# Patient Record
Sex: MALE | Race: WHITE | URBAN - METROPOLITAN AREA
[De-identification: names, ages, dates, MRNs, and addresses within clinical notes are randomized per-mention and may not be internally consistent; named-entity substitution may affect disease eponyms.]

---

## 2017-05-10 ENCOUNTER — HOSPITAL ENCOUNTER (OUTPATIENT)
Dept: LAB | Age: 27
Discharge: HOME | End: 2017-05-10
Payer: COMMERCIAL

## 2017-05-10 ENCOUNTER — OFFICE VISIT (OUTPATIENT)
Dept: FAMILY MEDICINE CLINIC | Age: 27
End: 2017-05-10

## 2017-05-10 VITALS
WEIGHT: 156.8 LBS | OXYGEN SATURATION: 98 % | SYSTOLIC BLOOD PRESSURE: 110 MMHG | HEIGHT: 67 IN | BODY MASS INDEX: 24.61 KG/M2 | HEART RATE: 80 BPM | DIASTOLIC BLOOD PRESSURE: 80 MMHG | TEMPERATURE: 98.1 F | RESPIRATION RATE: 20 BRPM

## 2017-05-10 DIAGNOSIS — R21 MALAR RASH: ICD-10-CM

## 2017-05-10 DIAGNOSIS — G47.9 SLEEP DISTURBANCE: Primary | ICD-10-CM

## 2017-05-10 PROCEDURE — 86225 DNA ANTIBODY NATIVE: CPT | Performed by: INTERNAL MEDICINE

## 2017-05-10 PROCEDURE — 86038 ANTINUCLEAR ANTIBODIES: CPT | Performed by: INTERNAL MEDICINE

## 2017-05-10 PROCEDURE — 36415 COLL VENOUS BLD VENIPUNCTURE: CPT | Performed by: INTERNAL MEDICINE

## 2017-05-10 PROCEDURE — 86235 NUCLEAR ANTIGEN ANTIBODY: CPT | Performed by: INTERNAL MEDICINE

## 2017-05-10 RX ORDER — TRAZODONE HYDROCHLORIDE 100 MG/1
100 TABLET ORAL
Qty: 30 TAB | Refills: 1 | Status: SHIPPED | OUTPATIENT
Start: 2017-05-10

## 2017-05-10 NOTE — PATIENT INSTRUCTIONS
You have been referred to dermatology. Please call one of the preferred providers listed below and schedule your appointment. Once you have scheduled your appointment, please call the office at [phone] leave the details of your appointment (provider you will be seeing, appointment date and time) on the voice mail. [facility]   Dr. Yoon Bethea    [address] , [address]                Learning About Sleeping Well  What does sleeping well mean? Sleeping well means getting enough sleep. How much sleep is enough varies among people. The number of hours you sleep is not as important as how you feel when you wake up. If you do not feel refreshed, you probably need more sleep. Another sign of not getting enough sleep is feeling tired during the day. The average total nightly sleep time is 7½ to 8 hours. Healthy adults may need a little more or a little less than this. Why is getting enough sleep important? Getting enough quality sleep is a basic part of good health. When your sleep suffers, your mood and your thoughts can suffer too. You may find yourself feeling more grumpy or stressed. Not getting enough sleep also can lead to serious problems, including injury, accidents, anxiety, and depression. What might cause poor sleeping? Many things can cause sleep problems, including:  · Stress. Stress can be caused by fear about a single event, such as giving a speech. Or you may have ongoing stress, such as worry about work or school. · Depression, anxiety, and other mental or emotional conditions. · Changes in your sleep habits or surroundings. This includes changes that happen where you sleep, such as noise, light, or sleeping in a different bed. It also includes changes in your sleep pattern, such as having jet lag or working a late shift. · Health problems, such as pain, breathing problems, and restless legs syndrome.   · Lack of regular exercise. How can you help yourself? Here are some tips that may help you sleep more soundly and wake up feeling more refreshed. Your sleeping area  · Use your bedroom only for sleeping and sex. A bit of light reading may help you fall asleep. But if it doesn't, do your reading elsewhere in the house. Don't watch TV in bed. · Be sure your bed is big enough to stretch out comfortably, especially if you have a sleep partner. · Keep your bedroom quiet, dark, and cool. Use curtains, blinds, or a sleep mask to block out light. To block out noise, use earplugs, soothing music, or a \"white noise\" machine. Your evening and bedtime routine  · Create a relaxing bedtime routine. You might want to take a warm shower or bath, listen to soothing music, or drink a cup of noncaffeinated tea. · Go to bed at the same time every night. And get up at the same time every morning, even if you feel tired. What to avoid  · Limit caffeine (coffee, tea, caffeinated sodas) during the day, and don't have any for at least 4 to 6 hours before bedtime. · Don't drink alcohol before bedtime. Alcohol can cause you to wake up more often during the night. · Don't smoke or use tobacco, especially in the evening. Nicotine can keep you awake. · Don't take naps during the day, especially close to bedtime. · Don't lie in bed awake for too long. If you can't fall asleep, or if you wake up in the middle of the night and can't get back to sleep within 15 minutes or so, get out of bed and go to another room until you feel sleepy. · Don't take medicine right before bed that may keep you awake or make you feel hyper or energized. Your doctor can tell you if your medicine may do this and if you can take it earlier in the day. If you can't sleep  · Imagine yourself in a peaceful, pleasant scene. Focus on the details and feelings of being in a place that is relaxing. · Get up and do a quiet or boring activity until you feel sleepy.   · Don't drink any liquids after 6 p.m. if you wake up often because you have to go to the bathroom. Where can you learn more? Go to http://briseida-evelia.info/. Enter F394 in the search box to learn more about \"Learning About Sleeping Well. \"  Current as of: July 26, 2016  Content Version: 11.2  © 6244-5357 [employer]. Care instructions adapted under license by [employer] (which disclaims liability or warranty for this information). If you have questions about a medical condition or this instruction, always ask your healthcare professional. [place] [address] any warranty or liability for your use of this information.

## 2017-05-10 NOTE — MR AVS SNAPSHOT
Visit Information Date & Time Provider Department Dept. Phone Encounter #  
 5/10/2017  9:30 AM Marcio Landers, [facility] [phone] [id number] Upcoming Health Maintenance Date Due DTaP/Tdap/Td series (1 - Tdap) 10/26/2012 INFLUENZA AGE 9 TO ADULT 8/1/2017 Allergies as of 5/10/2017  Review Complete On: 8/23/2016 By: Marcio Landers MD  
  
 Severity Noted Reaction Type Reactions Pcn [Penicillins]  07/01/2016    Rash Current Immunizations  Reviewed on 7/1/2016 No immunizations on file. Not reviewed this visit You Were Diagnosed With   
  
 Codes Comments Sleep disturbance    -  Primary ICD-10-CM: G47.9 ICD-9-CM: 780.50 Malar rash     ICD-10-CM: R21 
ICD-9-CM: 782.1 Vitals BP Pulse Temp Resp Height(growth percentile) Weight(growth percentile) 110/80 (BP 1 Location: Left arm, BP Patient Position: Sitting) 80 98.1 °F (36.7 °C) (Oral) 20 5' 6.5\" (1.689 m) 156 lb 12.8 oz (71.1 kg) SpO2 BMI Smoking Status 98% 24.93 kg/m2 Never Smoker BMI and BSA Data Body Mass Index Body Surface Area 24.93 kg/m 2 1.83 m 2 Preferred Pharmacy Pharmacy Name Phone [facility] [facility] [phone] Your Updated Medication List  
  
   
This list is accurate as of: 5/10/17 10:54 AM.  Always use your most recent med list.  
  
  
  
  
 melatonin 3 mg tablet Take 1 mg by mouth every evening. traZODone 100 mg tablet Commonly known as:  Arneta Messenger Take 1 Tab by mouth nightly. Prescriptions Sent to Pharmacy Refills  
 traZODone (DESYREL) 100 mg tablet 1 Sig: Take 1 Tab by mouth nightly. Class: Normal  
 Pharmacy: [facility] [address] [facility] [facility] [address] Ph #: [phone] Route: Oral  
  
To-Do List   
 05/10/2017 Lab:  BART SHELL, W/REFLEX CASCADE   
  
 05/10/2017 Lab: DNA AB, DOUBLE STRANDED, IGG   
  
 05/10/2017 Lab:  [facility] ANTIBODIES Patient Instructions You have been referred to dermatology. Please call one of the preferred providers listed below and schedule your appointment. Once you have scheduled your appointment, please call the office at [phone] leave the details of your appointment (provider you will be seeing, appointment date and time) on the voice mail. [facility] Dermat[facility] Dr. Edie Serna.,  Lizzy Lozano [address] Learning About Sleeping Well What does sleeping well mean? Sleeping well means getting enough sleep. How much sleep is enough varies among people. The number of hours you sleep is not as important as how you feel when you wake up. If you do not feel refreshed, you probably need more sleep. Another sign of not getting enough sleep is feeling tired during the day. The average total nightly sleep time is 7½ to 8 hours. Healthy adults may need a little more or a little less than this. Why is getting enough sleep important? Getting enough quality sleep is a basic part of good health. When your sleep suffers, your mood and your thoughts can suffer too. You may find yourself feeling more grumpy or stressed. Not getting enough sleep also can lead to serious problems, including injury, accidents, anxiety, and depression. What might cause poor sleeping? Many things can cause sleep problems, including: · Stress. Stress can be caused by fear about a single event, such as giving a speech. Or you may have ongoing stress, such as worry about work or school. · Depression, anxiety, and other mental or emotional conditions. · Changes in your sleep habits or surroundings. This includes changes that happen where you sleep, such as noise, light, or sleeping in a different bed.  It also includes changes in your sleep pattern, such as having jet lag or working a late shift. · Health problems, such as pain, breathing problems, and restless legs syndrome. · Lack of regular exercise. How can you help yourself? Here are some tips that may help you sleep more soundly and wake up feeling more refreshed. Your sleeping area · Use your bedroom only for sleeping and sex. A bit of light reading may help you fall asleep. But if it doesn't, do your reading elsewhere in the house. Don't watch TV in bed. · Be sure your bed is big enough to stretch out comfortably, especially if you have a sleep partner. · Keep your bedroom quiet, dark, and cool. Use curtains, blinds, or a sleep mask to block out light. To block out noise, use earplugs, soothing music, or a \"white noise\" machine. Your evening and bedtime routine · Create a relaxing bedtime routine. You might want to take a warm shower or bath, listen to soothing music, or drink a cup of noncaffeinated tea. · Go to bed at the same time every night. And get up at the same time every morning, even if you feel tired. What to avoid · Limit caffeine (coffee, tea, caffeinated sodas) during the day, and don't have any for at least 4 to 6 hours before bedtime. · Don't drink alcohol before bedtime. Alcohol can cause you to wake up more often during the night. · Don't smoke or use tobacco, especially in the evening. Nicotine can keep you awake. · Don't take naps during the day, especially close to bedtime. · Don't lie in bed awake for too long. If you can't fall asleep, or if you wake up in the middle of the night and can't get back to sleep within 15 minutes or so, get out of bed and go to another room until you feel sleepy. · Don't take medicine right before bed that may keep you awake or make you feel hyper or energized. Your doctor can tell you if your medicine may do this and if you can take it earlier in the day. If you can't sleep · Imagine yourself in a peaceful, pleasant scene.  Focus on the details and feelings of being in a place that is relaxing. · Get up and do a quiet or boring activity until you feel sleepy. · Don't drink any liquids after 6 p.m. if you wake up often because you have to go to the bathroom. Where can you learn more? Go to http://briseida-evelia.info/. Enter N567 in the search box to learn more about \"Learning About Sleeping Well. \" Current as of: July 26, 2016 Content Version: 11.2 © 8907-1925 [employer] Care instructions adapted under license by [employer] (which disclaims liability or warranty for this information). If you have questions about a medical condition or this instruction, always ask your healthcare professional. [address] any warranty or liability for your use of this information. Introducing [facility] & HEALTH SERVICES! Maria Esther Nath introduces Secure Fortress patient portal. Now you can access parts of your medical record, email your doctor's office, and request medication refills online. 1. In your internet browser, go to [email or web address] 2. Click on the First Time User? Click Here link in the Sign In box. You will see the New Member Sign Up page. 3. Enter your Secure Fortress Access Code exactly as it appears below. You will not need to use this code after youve completed the sign-up process. If you do not sign up before the expiration date, you must request a new code. · Secure Fortress Access Code: [email or web address] Expires: 8/8/2017 10:54 AM 
 
4. Enter the last four digits of your Social Security Number (xxxx) and Date of Birth (mm/dd/yyyy) as indicated and click Submit. You will be taken to the next sign-up page. 5. Create a Secure Fortress ID. This will be your Secure Fortress login ID and cannot be changed, so think of one that is secure and easy to remember. 6. Create a Secure Fortress password. You can change your password at any time. 7. Enter your Password Reset Question and Answer.  This can be used at a later time if you forget your password. 8. Enter your e-mail address. You will receive e-mail notification when new information is available in [address] 9. Click Sign Up. You can now view and download portions of your medical record. 10. Click the Download Summary menu link to download a portable copy of your medical information. If you have questions, please visit the Frequently Asked Questions section of the Vector[employer] website. Remember, [employer] is NOT to be used for urgent needs. For medical emergencies, dial 911. Now available from your iPhone and Android! Please provide this summary of care documentation to your next provider. Your primary care clinician is listed as Rajani 51. If you have any questions after today's visit, please call [phone].

## 2017-05-10 NOTE — PROGRESS NOTES
Nakita Castro is a 23 y.o. male  Chief Complaint   Patient presents with    Sleep Problem     1. Have you been to the ER, urgent care clinic since your last visit? Hospitalized since your last visit? No    2. Have you seen or consulted any other health care providers outside of the [address] [place] [address] since your last visit? Include any pap smears or colon screening.  No

## 2017-05-11 LAB
ANA SER QL: NEGATIVE
DSDNA AB SER-ACNC: <1 IU/ML (ref 0–9)
ENA SM AB SER-ACNC: <0.2 AI (ref 0–0.9)
SEE BELOW:, 164879: NORMAL

## 2017-05-11 NOTE — PROGRESS NOTES
Assessment/Plan:    *Bogdan was seen today for sleep problem. Diagnoses and all orders for this visit:    Sleep disturbance  -     traZODone (DESYREL) 100 mg tablet; Take 1 Tab by mouth nightly. Malar rash  -     BART QL, W/REFLEX CASCADE; Future  -     DNA AB, DOUBLE STRANDED, IGG; Future  -     SMITH ANTIBODIES; Future  -     BART QL, W/REFLEX CASCADE  -     DNA AB, DOUBLE STRANDED, IGG  -     MILLAN ANTIBODIES        Pt was instructed to try Trazodone as 50 mg then can advance if needed to 100 mg and then 200 mg qhs. Will notify me if he needs to increase dose. Will contact pt on above results. Will await dermatology recommendations. The plan was discussed with the patient. The patient verbalized understanding and is in agreement with the plan. All medication potential side effects were discussed with the patient.    -------------------------------------------------------------------------------------------------------------------        Aniyah Whitten is a 23 y.o. male and presents with Sleep Problem         Subjective:  Pt here for sleep issues. Has not been getting proper rest, falls asleep without issue but will then wake up several times after this. Has noted this ongoing for 1 year. He relates it to having nightmares that disturb his sleep and then he can not fall back asleep. Had a bad break-up with a girlfriend that he has not been able to forget. He has been thinking about seeing a therapist.  Natalie Quiñones his bother had seen one in the past who he was happy with. Pt also has noted issues with his skin for a few years. Says it is a family thing, his father and brother both have sensitive skin. He has noted a rash on both cheeks, under the eyes. ROS:  Constitutional: No recent weight change. No weakness/fatigue. No f/c. Skin: No rashes, change in nails/hair, itching   HENT: No HA, dizziness. No hearing loss/tinnitus. No nasal congestion/discharge.    Eyes: No change in vision, double/blurred vision or eye pain/redness. Cardiovascular: No CP/palpitations. No PURCELL/orthopnea/PND. Respiratory: No cough/sputum, dyspnea, wheezing. Gastointestinal: No dysphagia, reflux. No n/v. No constipation/diarrhea. No melena/rectal bleeding. Genitourinary: No dysuria, urinary hesitancy, nocturia, hematuria. No incontinence. Musculoskeletal: No joint pain/stiffness. No muscle pain/tenderness. Endo: No heat/cold intolerance, no polyuria/polydypsia. Heme: No h/o anemia. No easy bleeding/bruising. Allergy/Immunology: No seasonal rhinitis. Denies frequent colds, sinus/ear infections. Neurological: No seizures/numbness/weakness. No paresthesias. Psychiatric:  No depression, anxiety. The problem list was updated as a part of today's visit. There is no problem list on file for this patient. The PSH, FH were reviewed. SH:  Social History   Substance Use Topics    Smoking status: Never Smoker    Smokeless tobacco: None    Alcohol use 3.0 oz/week     5 Cans of beer per week      Comment: 5       Medications/Allergies:  Current Outpatient Prescriptions on File Prior to Visit   Medication Sig Dispense Refill    melatonin 3 mg tablet Take 1 mg by mouth every evening. No current facility-administered medications on file prior to visit. Allergies   Allergen Reactions    Pcn [Penicillins] Rash         Health Maintenance:   Health Maintenance   Topic Date Due    DTaP/Tdap/Td series (1 - Tdap) 10/26/2012    INFLUENZA AGE 9 TO ADULT  08/01/2017       Objective:  Visit Vitals    /80 (BP 1 Location: Left arm, BP Patient Position: Sitting)    Pulse 80    Temp 98.1 °F (36.7 °C) (Oral)    Resp 20    Ht 5' 6.5\" (1.689 m)    Wt 156 lb 12.8 oz (71.1 kg)    SpO2 98%    BMI 24.93 kg/m2          Nurses notes and VS reviewed.       Physical Examination: General appearance - alert, well appearing, and in no distress        Labwork and Ancillary Studies:    CBC w/Diff  Lab Results   Component Value Date/Time    WBC 4.2 07/08/2016 10:40 AM    HGB 14.1 07/08/2016 10:40 AM    PLATELET 384 61/36/3032 10:40 AM         Basic Metabolic Profile  Lab Results   Component Value Date/Time    Sodium 140 07/08/2016 10:40 AM    Potassium 4.4 07/08/2016 10:40 AM    Chloride 99 07/08/2016 10:40 AM    CO2 27 07/08/2016 10:40 AM    Anion gap 14.0 07/08/2016 10:40 AM    Glucose 87 07/08/2016 10:40 AM    BUN 19 07/08/2016 10:40 AM    Creatinine 1.0 07/08/2016 10:40 AM    Calcium 9.6 07/08/2016 10:40 AM         LFT  Lab Results   Component Value Date/Time    ALT (SGPT) 26 07/08/2016 10:40 AM    AST (SGOT) 31 07/08/2016 10:40 AM    Alk.  phosphatase 55 07/08/2016 10:40 AM    Bilirubin, direct <0.2 07/08/2016 10:40 AM    Bilirubin, total 0.7 07/08/2016 10:40 AM         Cholesterol  Lab Results   Component Value Date/Time    Cholesterol, total 195 07/08/2016 10:40 AM    HDL Cholesterol 61 07/08/2016 10:40 AM    LDL, calculated 120 07/08/2016 10:40 AM    Triglyceride 71 07/08/2016 10:40 AM

## 2017-05-12 NOTE — PROGRESS NOTES
Notify pt his labs were normal.  His rash is NOT lupus related. Will see what dermatology recommends.

## 2020-05-11 ENCOUNTER — TELEPHONE (OUTPATIENT)
Dept: FAMILY MEDICINE CLINIC | Age: 30
End: 2020-05-11

## 2020-05-11 NOTE — TELEPHONE ENCOUNTER
Pt is calling because he would like an office appt to discuss fertility issues was told that we are only doing virtual appts at the moment.  Not sure if this visit can be done through virtual or if pt needs an in office appt

## 2020-05-12 ENCOUNTER — VIRTUAL VISIT (OUTPATIENT)
Dept: FAMILY MEDICINE CLINIC | Age: 30
End: 2020-05-12

## 2020-05-12 DIAGNOSIS — N46.9 INFERTILITY MALE: Primary | ICD-10-CM

## 2020-05-14 ENCOUNTER — HOSPITAL ENCOUNTER (OUTPATIENT)
Dept: LAB | Age: 30
Discharge: HOME | End: 2020-05-14
Payer: COMMERCIAL

## 2020-05-14 DIAGNOSIS — N46.9 INFERTILITY MALE: ICD-10-CM

## 2020-05-14 PROCEDURE — 36415 COLL VENOUS BLD VENIPUNCTURE: CPT

## 2020-05-14 PROCEDURE — 84403 ASSAY OF TOTAL TESTOSTERONE: CPT

## 2020-05-14 PROCEDURE — 83001 ASSAY OF GONADOTROPIN (FSH): CPT

## 2020-05-14 PROCEDURE — 84146 ASSAY OF PROLACTIN: CPT

## 2020-05-15 LAB
FSH SERPL-ACNC: 5.4 MIU/ML
LH SERPL-ACNC: 3.7 MIU/ML
PROLACTIN SERPL-MCNC: 9 NG/ML

## 2020-05-18 LAB
TESTOST FREE SERPL-MCNC: 12.8 PG/ML (ref 9.3–26.5)
TESTOST SERPL-MCNC: 618 NG/DL (ref 264–916)

## 2020-05-18 NOTE — PROGRESS NOTES
Please notify pt that his labs all returned normal.  If he wishes to pursue things further, he can reach out to one of the referrals we generated. I believe that it is likely just a situation that needs more time with regards to his partner being able to conceive. So, I think he should give it more time like until the end of this year. If they are eager to conceive then they should track her cycles and ovulation. She can consult her Gyn on this.

## 2020-05-19 NOTE — PROGRESS NOTES
Spoke to pt. He verbalized understanding of results and recommendations. I told him referrals were sent yesterday if he wants to pursue this further on his side of the matter.

## 2022-02-03 NOTE — PROGRESS NOTES
Gregory Armendariz is a 30 y.o. male who was seen by synchronous (real-time) audio-video technology on 5/12/2020. Consent: Gregory Armendariz, who was seen by synchronous (real-time) audio-video technology, and/or his healthcare decision maker, is aware that this patient-initiated, Telehealth encounter on 5/12/2020 is a billable service, with coverage as determined by his insurance carrier. He is aware that he may receive a bill and has provided verbal consent to proceed: Yes. Assessment & Plan:   Diagnoses and all orders for this visit:    1. Infertility male  -     [facility] AND LH; Future  -     TESTOSTERONE, FREE & TOTAL; Future  -     REFERRAL TO INFERTILITY  -     REFERRAL TO UROLOGY  -     PROLACTIN; Future        Will await results and he will follow through with one of the referrals above. 712  Subjective: Gregory Armendariz is a 30 y.o. male who was seen for Infertility    Pt was seen on a virtual visit today. Pt has some concerns about possible infertility. His girlfriend has been off birth control x 4 months and although they have not been actively trying, they have had intercourse several times and she has not gotten pregnant yet. He started doing some of his own research on semen and what it typically looks like, and feels that his own semen does not match what he has read about. At the same time. he is aware that it can take some time for the woman's body to self regulate after years of birth control. He thinks the appearance (color and texture) of his semen does not match what he has read about and just wants to be evaluated to make sure. 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 Other than that, the rest of his development through life has been normal.      Prior to Admission medications    Medication Sig Start Date End Date Taking? Authorizing Provider   traZODone (DESYREL) 100 mg tablet Take 1 Tab by mouth nightly.  5/10/17 Trevor Alamo MD   melatonin 3 mg tablet Take 1 mg by mouth every evening. Provider, Historical     Allergies   Allergen Reactions    Pcn [Penicillins] Rash       There is no problem list on file for this patient. Current Outpatient Medications   Medication Sig Dispense Refill    traZODone (DESYREL) 100 mg tablet Take 1 Tab by mouth nightly. 30 Tab 1    melatonin 3 mg tablet Take 1 mg by mouth every evening. Allergies   Allergen Reactions    Pcn [Penicillins] Rash     No past medical history on file. No past surgical history on file. Review of Systems   All other systems reviewed and are negative. Objective: There were no vitals taken for this visit. General: alert, cooperative, no distress   Mental  status: normal mood, behavior, speech, dress, motor activity, and thought processes, able to follow commands   HENT: NCAT   Neck: no visualized mass   Resp: no respiratory distress   Neuro: no gross deficits   Skin: no discoloration or lesions of concern on visible areas   Psychiatric: normal affect, consistent with stated mood, no evidence of hallucinations     Additional exam findings: We discussed the expected course, resolution and complications of the diagnosis(es) in detail. Medication risks, benefits, costs, interactions, and alternatives were discussed as indicated. I advised him to contact the office if his condition worsens, changes or fails to improve as anticipated. He expressed understanding with the diagnosis(es) and plan. Arsalan Cesar is a 30 y.o. male who was evaluated by a video visit encounter for concerns as above. Patient identification was verified prior to start of the visit. A caregiver was present when appropriate. Due to this being a TeleHealth encounter (During GMRRG-77 public health emergency), evaluation of the following organ systems was limited: Vitals/Constitutional/EENT/Resp/CV/GI//MS/Neuro/Skin/Heme-Lymph-Imm.   Pursuant to the emergency declaration under the [facility] [address] [facility] waiver authority and the [employer] and [employer] Act, this Virtual  Visit was conducted, with patient's (and/or legal guardian's) consent, to reduce the patient's risk of exposure to COVID-19 and provide necessary medical care. Services were provided through a video synchronous discussion virtually to substitute for in-person clinic visit. Patient and provider were located at their individual homes.       Leoncio Gan MD Oxybutynin Counseling:  I discussed with the patient the risks of oxybutynin including but not limited to skin rash, drowsiness, dry mouth, difficulty urinating, and blurred vision.